# Patient Record
Sex: MALE | URBAN - METROPOLITAN AREA
[De-identification: names, ages, dates, MRNs, and addresses within clinical notes are randomized per-mention and may not be internally consistent; named-entity substitution may affect disease eponyms.]

---

## 2020-07-23 ENCOUNTER — CLAIMS CREATED FROM THE CLAIM WINDOW (OUTPATIENT)
Dept: URBAN - METROPOLITAN AREA MEDICAL CENTER 10 | Facility: MEDICAL CENTER | Age: 55
End: 2020-07-23
Payer: COMMERCIAL

## 2020-07-23 DIAGNOSIS — K76.6 CLINICALLY SIGNIFICANT PORTAL HYPERTENSION: ICD-10-CM

## 2020-07-23 DIAGNOSIS — K70.30 ALC (ALCOHOLIC LIVER CIRRHOSIS): ICD-10-CM

## 2020-07-23 DIAGNOSIS — I85.11 SECONDARY ESOPHAGEAL VARICES WITH BLEEDING: ICD-10-CM

## 2020-07-23 DIAGNOSIS — K31.89 ACQUIRED DEFORMITY OF DUODENUM: ICD-10-CM

## 2020-07-23 DIAGNOSIS — K31.89 ACQUIRED DEFORMITY OF PYLORUS: ICD-10-CM

## 2020-07-23 PROCEDURE — 43244 EGD VARICES LIGATION: CPT | Performed by: INTERNAL MEDICINE

## 2020-07-23 PROCEDURE — 99223 1ST HOSP IP/OBS HIGH 75: CPT | Performed by: INTERNAL MEDICINE

## 2020-07-24 ENCOUNTER — OUT OF OFFICE VISIT (OUTPATIENT)
Dept: URBAN - METROPOLITAN AREA MEDICAL CENTER 10 | Facility: MEDICAL CENTER | Age: 55
End: 2020-07-24
Payer: COMMERCIAL

## 2020-07-24 DIAGNOSIS — K92.2 ACUTE GASTROINTESTINAL BLEEDING: ICD-10-CM

## 2020-07-24 DIAGNOSIS — I85.10 ESOPH VARICE OTHER DIS: ICD-10-CM

## 2020-07-24 DIAGNOSIS — K31.89 ACQUIRED DEFORMITY OF DUODENUM: ICD-10-CM

## 2020-07-24 DIAGNOSIS — K76.6 CLINICALLY SIGNIFICANT PORTAL HYPERTENSION: ICD-10-CM

## 2020-07-24 PROCEDURE — 99232 SBSQ HOSP IP/OBS MODERATE 35: CPT | Performed by: INTERNAL MEDICINE

## 2020-07-24 PROCEDURE — 43235 EGD DIAGNOSTIC BRUSH WASH: CPT | Performed by: INTERNAL MEDICINE

## 2020-08-14 ENCOUNTER — TELEPHONE ENCOUNTER (OUTPATIENT)
Dept: URBAN - METROPOLITAN AREA CLINIC 35 | Facility: CLINIC | Age: 55
End: 2020-08-14

## 2020-09-04 ENCOUNTER — OFFICE VISIT (OUTPATIENT)
Dept: URBAN - METROPOLITAN AREA CLINIC 35 | Facility: CLINIC | Age: 55
End: 2020-09-04

## 2020-09-04 RX ORDER — LOSARTAN POTASSIUM 25 MG/1
1 TABLET TABLET, FILM COATED ORAL ONCE A DAY
Qty: 30 | Status: ACTIVE | COMMUNITY

## 2020-09-04 RX ORDER — INSULIN DETEMIR 100 [IU]/ML
25 UNIT INJECTION, SOLUTION SUBCUTANEOUS ONCE A DAY
Status: ACTIVE | COMMUNITY

## 2020-09-04 RX ORDER — LOSARTAN POTASSIUM 25 MG/1
1 TABLET TABLET ORAL ONCE A DAY
COMMUNITY

## 2020-09-04 RX ORDER — ESOMEPRAZOLE MAGNESIUM 20 MG/1
1 CAPSULE CAPSULE, DELAYED RELEASE ORAL ONCE A DAY
COMMUNITY

## 2020-09-04 RX ORDER — SODIUM SULFATE, POTASSIUM SULFATE, MAGNESIUM SULFATE 17.5; 3.13; 1.6 G/ML; G/ML; G/ML
AS DIRECTED SOLUTION, CONCENTRATE ORAL
Qty: 1 | Refills: 0 | COMMUNITY
Start: 2017-10-27

## 2020-09-04 RX ORDER — ALUMINUM ZIRCONIUM TRICHLOROHYDREX GLY 0.19 G/G
1 TABLET 30 MINUTES BEFORE MORNING MEAL STICK TOPICAL ONCE A DAY
Qty: 30 | Status: ACTIVE | COMMUNITY

## 2020-09-04 RX ORDER — FUROSEMIDE 40 MG/1
1 TABLET TABLET ORAL ONCE A DAY
Qty: 30 | Status: ACTIVE | COMMUNITY

## 2020-09-04 NOTE — HPI-MIGRATED HPI
;     Hospital Follow Up : Patient is a -year-old  fe/male, who presents today to follow up of ER visit from 07/23/2020 to 07/27/2020. He was admitted for vomiting blood. He had CXR, EKG, IV fluids - Protonix and Octreotide IV drips. He also had EGD on 07/23/2020 and 07/25/2020 with results below:  EGD (07/23/2020) -Normal hypopharynx. -Bleeding large (>5mm) esophageal varices. Incompletely eradicated. Banded. -Z-line regular, 45 cm from the incisors. -Red blood in the gastric fundus. -Multiple gastric polyps. -Portal hypertensive gastropathy. -Normal examined duodenum. -No specimens collected.  EGD (07/25/2020) -Grade II esophageal varices -Portal hypertensive gastropathy. -A few mucosal paules (nodules) found in the stomach. -Normal examined duodenum. -No Specimens collected.      His diagnosis at [facility] was acute upper GI bleeding, alcohol abuse, and orthostatic hypotension.   melena, blood, or mucus in stool, vomitting blood,   the exacerbateing factor  relieving factor  a personal history of variceal bleeding;

## 2020-09-24 ENCOUNTER — OFFICE VISIT (OUTPATIENT)
Dept: URBAN - METROPOLITAN AREA CLINIC 35 | Facility: CLINIC | Age: 55
End: 2020-09-24

## 2020-09-24 VITALS
SYSTOLIC BLOOD PRESSURE: 120 MMHG | HEART RATE: 64 BPM | TEMPERATURE: 97.4 F | BODY MASS INDEX: 32.91 KG/M2 | OXYGEN SATURATION: 99 % | HEIGHT: 72 IN | WEIGHT: 243 LBS | DIASTOLIC BLOOD PRESSURE: 70 MMHG

## 2020-09-24 RX ORDER — LOSARTAN POTASSIUM 25 MG/1
1 TABLET TABLET, FILM COATED ORAL ONCE A DAY
Qty: 30 | Status: ACTIVE | COMMUNITY

## 2020-09-24 RX ORDER — INSULIN LISPRO 100 [IU]/ML
AS DIRECTED INJECTION, SOLUTION INTRAVENOUS; SUBCUTANEOUS
Status: ACTIVE | COMMUNITY

## 2020-09-24 RX ORDER — NADOLOL 20 MG/1
1 TABLET TABLET ORAL ONCE A DAY
Qty: 30 TABLET | Refills: 5 | OUTPATIENT

## 2020-09-24 RX ORDER — ALUMINUM ZIRCONIUM TRICHLOROHYDREX GLY 0.19 G/G
1 TABLET 30 MINUTES BEFORE MORNING MEAL STICK TOPICAL ONCE A DAY
Qty: 30 | Status: DISCONTINUED | COMMUNITY

## 2020-09-24 RX ORDER — INSULIN DETEMIR 100 [IU]/ML
25 UNIT INJECTION, SOLUTION SUBCUTANEOUS ONCE A DAY
Status: ACTIVE | COMMUNITY

## 2020-09-24 RX ORDER — PANTOPRAZOLE SODIUM 20 MG/1
1 TABLET TABLET, DELAYED RELEASE ORAL ONCE A DAY
Qty: 30 | Status: ACTIVE | COMMUNITY

## 2020-09-24 RX ORDER — ESOMEPRAZOLE MAGNESIUM 20 MG/1
1 CAPSULE CAPSULE, DELAYED RELEASE ORAL ONCE A DAY
Status: ON HOLD | COMMUNITY

## 2020-09-24 RX ORDER — FUROSEMIDE 40 MG/1
1 TABLET TABLET ORAL ONCE A DAY
Qty: 30 TABLET | Refills: 5 | OUTPATIENT

## 2020-09-24 RX ORDER — NADOLOL 20 MG/1
1 TABLET TABLET ORAL ONCE A DAY
Status: ACTIVE | COMMUNITY

## 2020-09-24 RX ORDER — SODIUM SULFATE, POTASSIUM SULFATE, MAGNESIUM SULFATE 17.5; 3.13; 1.6 G/ML; G/ML; G/ML
AS DIRECTED SOLUTION, CONCENTRATE ORAL
Qty: 1 | Refills: 0 | Status: ON HOLD | COMMUNITY
Start: 2017-10-27

## 2020-09-24 RX ORDER — MULTIVITAMIN
1 TABLET TABLET ORAL ONCE A DAY
Qty: 30 | Status: ACTIVE | COMMUNITY

## 2020-09-24 RX ORDER — FUROSEMIDE 40 MG/1
1 TABLET TABLET ORAL ONCE A DAY
Qty: 30 | Status: ACTIVE | COMMUNITY

## 2020-09-24 RX ORDER — SODIUM SULFATE, POTASSIUM SULFATE, MAGNESIUM SULFATE 17.5; 3.13; 1.6 G/ML; G/ML; G/ML
AS DIRECTED SOLUTION, CONCENTRATE ORAL
Qty: 1 | Refills: 0 | OUTPATIENT
Start: 2020-09-24

## 2020-09-24 RX ORDER — LOSARTAN POTASSIUM 25 MG/1
1 TABLET TABLET ORAL ONCE A DAY
Status: ON HOLD | COMMUNITY

## 2020-09-24 RX ORDER — PANTOPRAZOLE SODIUM 20 MG/1
1 TABLET TABLET, DELAYED RELEASE ORAL TWICE A DAY
Qty: 60 | Refills: 2 | OUTPATIENT

## 2020-09-24 NOTE — EXAM-MIGRATED EXAMINATIONS
GENERAL APPEARANCE: - alert, in no acute distress, well developed, well nourished;   HEAD: - normocephalic, atraumatic;   EYES: - sclera anicteric bilaterally;   EARS: - normal;   ORAL CAVITY: - mucosa moist;   THROAT: - clear;   NECK/THYROID: - neck supple, full range of motion, no cervical lymphadenopathy, no thyroid nodules, no thyromegaly, trachea midline;   LYMPH NODES: - no cervical adenopathy, no supraclavicular adenopathy, no periumbilical adenopathy;   SKIN: - no suspicious lesions, warm and dry, no spider angiomata, palmar erythema or icterus;   HEART: - no murmurs, regular rate and rhythm, S1, S2 normal;   LUNGS: - clear to auscultation bilaterally, good air movement, no wheezes, rales, rhonchi;   ABDOMEN: - bowel sounds present, no masses palpable, no organomegaly , no rebound tenderness, soft, nontender, nondistended;   RECTAL: - deferred by patient;   PSYCH: - cooperative with exam, mood/affect full range;

## 2020-09-24 NOTE — HPI-MIGRATED HPI
;   ;     Colorectal Cancer Screening : Patient admits to one BM a day. He has never had a colonoscopy.  Patient denies any family history of colon cancer, colon polyps.  He admits stools are formed.;   Hospital Follow Up : Patient is a 55-year-old  male, who presents today to follow up of ER visit from 07/23/2020 to 07/27/2020. He was admitted for vomiting blood. He had IV fluids - Protonix and Octreotide IV drips.   Blood work on 07/23/2020 shows remarkable results of AG 13, Glucose 215, Blood Urea Nitrogen 27, U:C 38, Albumin 3.2, Bilirubin 3.2, Calcium 8.4, WBC 11.50, RBC 3.61, Hemoglobin 12.2, Hematocrit 37.3, .3, MCH 33.8, Red Cell Distribution with-CV 15.4, Red Cell Distribution width-SD 59.0, Pt Result 18.1. EKG on 07/23/2020 with results of NSR w/ nonspecific ST-T changes. Cardiac monitor on 07/23/2020 shows no premature ventricular contraction. CXR on 07/23/2020 showed streaky left basilar opacities favor atelectasis in the absence of infectious symptoms.   He also had EGD on 07/23/2020 and 07/25/2020 with results below: EGD (07/23/2020) -Normal hypopharynx. -Bleeding large (>5mm) esophageal varices. Incompletely eradicated. Banded. -Z-line regular, 45 cm from the incisors. -Red blood in the gastric fundus. -Multiple gastric polyps. -Portal hypertensive gastropathy. -Normal examined duodenum. -No specimens collected. EGD (07/25/2020) -Grade II esophageal varices -Portal hypertensive gastropathy. -A few mucosal paules (nodules) found in the stomach. -Normal examined duodenum. -No Specimens collected.   Pt admits to drinking 3-4 beers or hard alcohol drinks a day for about 15 years.  He states he now has 3-4 beers a week.  His diagnosis at [facility] was acute upper GI bleeding, alcohol abuse, and orthostatic hypotension. Currently, denies nausea, vomiting blood, abdominal pain, melena, blood, or mucus in stool, or unintentional weight loss. Pt reports that he is doing good in general. He has been trying to quit drinking and change his diet.  ;

## 2020-10-02 ENCOUNTER — WEB ENCOUNTER (OUTPATIENT)
Dept: URBAN - METROPOLITAN AREA CLINIC 35 | Facility: CLINIC | Age: 55
End: 2020-10-02

## 2020-10-02 RX ORDER — ESOMEPRAZOLE MAGNESIUM 20 MG/1
1 CAPSULE CAPSULE, DELAYED RELEASE ORAL ONCE A DAY
COMMUNITY

## 2020-10-02 RX ORDER — SODIUM SULFATE, POTASSIUM SULFATE, MAGNESIUM SULFATE 17.5; 3.13; 1.6 G/ML; G/ML; G/ML
AS DIRECTED SOLUTION, CONCENTRATE ORAL
Qty: 1 | Refills: 0 | COMMUNITY
Start: 2017-10-27

## 2020-10-02 RX ORDER — LOSARTAN POTASSIUM 25 MG/1
1 TABLET TABLET ORAL ONCE A DAY
COMMUNITY

## 2020-10-02 RX ORDER — LOSARTAN POTASSIUM 25 MG/1
1 TABLET TABLET, FILM COATED ORAL ONCE A DAY
Qty: 30 | COMMUNITY

## 2020-10-02 RX ORDER — NADOLOL 20 MG/1
1 TABLET TABLET ORAL ONCE A DAY
Qty: 30 TABLET | Refills: 5 | COMMUNITY

## 2020-10-02 RX ORDER — SODIUM SULFATE, POTASSIUM SULFATE, MAGNESIUM SULFATE 17.5; 3.13; 1.6 G/ML; G/ML; G/ML
AS DIRECTED SOLUTION, CONCENTRATE ORAL
Qty: 1 | Refills: 0 | COMMUNITY
Start: 2020-09-24

## 2020-10-02 RX ORDER — PANTOPRAZOLE SODIUM 40 MG/1
1 TABLET TABLET, DELAYED RELEASE ORAL TWICE A DAY
Qty: 60 | Refills: 5

## 2020-10-02 RX ORDER — MULTIVITAMIN
1 TABLET TABLET ORAL ONCE A DAY
Qty: 30 | COMMUNITY

## 2020-10-02 RX ORDER — FUROSEMIDE 40 MG/1
1 TABLET TABLET ORAL ONCE A DAY
Qty: 30 TABLET | Refills: 5 | COMMUNITY

## 2020-10-02 RX ORDER — PANTOPRAZOLE SODIUM 20 MG/1
1 TABLET TABLET, DELAYED RELEASE ORAL TWICE A DAY
Qty: 60 | Refills: 2 | COMMUNITY

## 2020-10-02 RX ORDER — INSULIN LISPRO 100 [IU]/ML
AS DIRECTED INJECTION, SOLUTION INTRAVENOUS; SUBCUTANEOUS
COMMUNITY

## 2020-10-02 RX ORDER — INSULIN DETEMIR 100 [IU]/ML
25 UNIT INJECTION, SOLUTION SUBCUTANEOUS ONCE A DAY
COMMUNITY

## 2020-11-13 ENCOUNTER — OFFICE VISIT (OUTPATIENT)
Dept: URBAN - METROPOLITAN AREA MEDICAL CENTER 10 | Facility: MEDICAL CENTER | Age: 55
End: 2020-11-13

## 2020-11-30 ENCOUNTER — OFFICE VISIT (OUTPATIENT)
Dept: URBAN - METROPOLITAN AREA CLINIC 35 | Facility: CLINIC | Age: 55
End: 2020-11-30

## 2020-11-30 VITALS — HEIGHT: 72 IN | WEIGHT: 252 LBS | BODY MASS INDEX: 34.13 KG/M2

## 2020-11-30 RX ORDER — SODIUM SULFATE, POTASSIUM SULFATE, MAGNESIUM SULFATE 17.5; 3.13; 1.6 G/ML; G/ML; G/ML
AS DIRECTED SOLUTION, CONCENTRATE ORAL
Qty: 1 | Refills: 0 | Status: DISCONTINUED | COMMUNITY
Start: 2017-10-27

## 2020-11-30 RX ORDER — SODIUM SULFATE, POTASSIUM SULFATE, MAGNESIUM SULFATE 17.5; 3.13; 1.6 G/ML; G/ML; G/ML
AS DIRECTED SOLUTION, CONCENTRATE ORAL
Qty: 1 | Refills: 0 | Status: DISCONTINUED | COMMUNITY
Start: 2020-09-24

## 2020-11-30 RX ORDER — LOSARTAN POTASSIUM 25 MG/1
1 TABLET TABLET ORAL ONCE A DAY
COMMUNITY

## 2020-11-30 RX ORDER — NADOLOL 20 MG/1
1 TABLET TABLET ORAL ONCE A DAY
Qty: 30 TABLET | Refills: 5 | Status: ACTIVE | COMMUNITY

## 2020-11-30 RX ORDER — ESOMEPRAZOLE MAGNESIUM 20 MG/1
1 CAPSULE CAPSULE, DELAYED RELEASE ORAL ONCE A DAY
COMMUNITY

## 2020-11-30 RX ORDER — FUROSEMIDE 40 MG/1
1 TABLET TABLET ORAL ONCE A DAY
Qty: 30 TABLET | Refills: 5 | Status: ACTIVE | COMMUNITY

## 2020-11-30 RX ORDER — INSULIN LISPRO 100 [IU]/ML
AS DIRECTED INJECTION, SOLUTION INTRAVENOUS; SUBCUTANEOUS
COMMUNITY

## 2020-11-30 RX ORDER — MULTIVITAMIN
1 TABLET TABLET ORAL ONCE A DAY
Qty: 30 | Status: ACTIVE | COMMUNITY

## 2020-11-30 RX ORDER — LOSARTAN POTASSIUM 25 MG/1
1 TABLET TABLET, FILM COATED ORAL ONCE A DAY
Qty: 30 | COMMUNITY

## 2020-11-30 RX ORDER — PANTOPRAZOLE SODIUM 40 MG/1
1 TABLET TABLET, DELAYED RELEASE ORAL TWICE A DAY
Qty: 60 | Refills: 5 | Status: ACTIVE | COMMUNITY

## 2020-11-30 RX ORDER — INSULIN DETEMIR 100 [IU]/ML
25 UNIT INJECTION, SOLUTION SUBCUTANEOUS ONCE A DAY
COMMUNITY

## 2020-11-30 NOTE — HPI-MIGRATED HPI
;   ;     Colonoscopy Follow-Up : Patient is a 55-year-old  male, who presents today to follow up s/p colonoscopy. Colonoscopy performed on 11/13/2020. Patient denies any complications after the procedure. Patient has 1 bowel movement a day. Stools are typically formed. Patient denies abdominal pain, melena, mucus, or blood in stool. ;   Hospital Follow Up : Admits occasional minor nausea in the moring. Denies vomiting blood, abdominal pain, melena, blood, or mucus in stool, or unintentional weight loss. He has not followed up with hepatology yet.   Last visit (09/24/2020)            Patient is a 55-year-old  male, who presents today to follow up of ER visit from 07/23/2020 to 07/27/2020. He was admitted for vomiting blood. He had IV fluids - Protonix and Octreotide IV drips.   Blood work on 07/23/2020 shows remarkable results of AG 13, Glucose 215, Blood Urea Nitrogen 27, U:C 38, Albumin 3.2, Bilirubin 3.2, Calcium 8.4, WBC 11.50, RBC 3.61, Hemoglobin 12.2, Hematocrit 37.3, .3, MCH 33.8, Red Cell Distribution with-CV 15.4, Red Cell Distribution width-SD 59.0, Pt Result 18.1. EKG on 07/23/2020 with results of NSR w/ nonspecific ST-T changes. Cardiac monitor on 07/23/2020 shows no premature ventricular contraction. CXR on 07/23/2020 showed streaky left basilar opacities favor atelectasis in the absence of infectious symptoms.   He also had EGD on 07/23/2020 and 07/25/2020 with results below: EGD (07/23/2020) -Normal hypopharynx. -Bleeding large (>5mm) esophageal varices. Incompletely eradicated. Banded. -Z-line regular, 45 cm from the incisors. -Red blood in the gastric fundus. -Multiple gastric polyps. -Portal hypertensive gastropathy. -Normal examined duodenum. -No specimens collected. EGD (07/25/2020) -Grade II esophageal varices -Portal hypertensive gastropathy. -A few mucosal paules (nodules) found in the stomach. -Normal examined duodenum. -No Specimens collected.   Pt admits to drinking 3-4 beers or hard alcohol drinks a day for about 15 years.  He states he now has 3-4 beers a week.  His diagnosis at [facility] was acute upper GI bleeding, alcohol abuse, and orthostatic hypotension. Currently, denies nausea, vomiting blood, abdominal pain, melena, blood, or mucus in stool, or unintentional weight loss. Pt reports that he is doing good in general. He has been trying to quit drinking and change his diet.  ;

## 2020-12-04 PROBLEM — 398050005 DIVERTICULAR DISEASE OF COLON: Status: ACTIVE | Noted: 2020-12-04

## 2020-12-04 PROBLEM — 420054005 ALCOHOLIC CIRRHOSIS: Status: ACTIVE | Noted: 2020-09-24

## 2020-12-04 PROBLEM — 17709002 ESOPHAGEAL VARICES WITH BLEEDING: Status: ACTIVE | Noted: 2020-09-24

## 2020-12-04 PROBLEM — 34742003 PORTAL HYPERTENSION: Status: ACTIVE | Noted: 2020-09-24

## 2021-07-23 ENCOUNTER — OUT OF OFFICE VISIT (OUTPATIENT)
Dept: URBAN - METROPOLITAN AREA MEDICAL CENTER 10 | Facility: MEDICAL CENTER | Age: 56
End: 2021-07-23
Payer: COMMERCIAL

## 2021-07-23 DIAGNOSIS — R10.84 ABDOMINAL CRAMPING, GENERALIZED: ICD-10-CM

## 2021-07-23 PROCEDURE — 992 APS NON BILLABLE: Performed by: INTERNAL MEDICINE
